# Patient Record
Sex: FEMALE | Race: WHITE | NOT HISPANIC OR LATINO
[De-identification: names, ages, dates, MRNs, and addresses within clinical notes are randomized per-mention and may not be internally consistent; named-entity substitution may affect disease eponyms.]

---

## 2017-01-27 ENCOUNTER — TRANSCRIPTION ENCOUNTER (OUTPATIENT)
Age: 57
End: 2017-01-27

## 2017-08-07 ENCOUNTER — TRANSCRIPTION ENCOUNTER (OUTPATIENT)
Age: 57
End: 2017-08-07

## 2018-01-29 ENCOUNTER — TRANSCRIPTION ENCOUNTER (OUTPATIENT)
Age: 58
End: 2018-01-29

## 2018-07-08 ENCOUNTER — TRANSCRIPTION ENCOUNTER (OUTPATIENT)
Age: 58
End: 2018-07-08

## 2019-10-03 PROBLEM — Z00.00 ENCOUNTER FOR PREVENTIVE HEALTH EXAMINATION: Status: ACTIVE | Noted: 2019-10-03

## 2019-10-04 ENCOUNTER — RECORD ABSTRACTING (OUTPATIENT)
Age: 59
End: 2019-10-04

## 2019-10-07 ENCOUNTER — NON-APPOINTMENT (OUTPATIENT)
Age: 59
End: 2019-10-07

## 2019-10-07 ENCOUNTER — APPOINTMENT (OUTPATIENT)
Dept: CARDIOLOGY | Facility: CLINIC | Age: 59
End: 2019-10-07
Payer: COMMERCIAL

## 2019-10-07 VITALS
HEIGHT: 60 IN | BODY MASS INDEX: 30.43 KG/M2 | HEART RATE: 79 BPM | RESPIRATION RATE: 15 BRPM | DIASTOLIC BLOOD PRESSURE: 60 MMHG | SYSTOLIC BLOOD PRESSURE: 100 MMHG | WEIGHT: 155 LBS

## 2019-10-07 DIAGNOSIS — R53.83 OTHER FATIGUE: ICD-10-CM

## 2019-10-07 DIAGNOSIS — I73.00 RAYNAUD'S SYNDROME W/OUT GANGRENE: ICD-10-CM

## 2019-10-07 DIAGNOSIS — Z82.49 FAMILY HISTORY OF ISCHEMIC HEART DISEASE AND OTHER DISEASES OF THE CIRCULATORY SYSTEM: ICD-10-CM

## 2019-10-07 DIAGNOSIS — Z72.3 LACK OF PHYSICAL EXERCISE: ICD-10-CM

## 2019-10-07 PROCEDURE — 99204 OFFICE O/P NEW MOD 45 MIN: CPT

## 2019-10-07 PROCEDURE — 93000 ELECTROCARDIOGRAM COMPLETE: CPT

## 2019-10-07 RX ORDER — PREDNISONE 5 MG/1
5 TABLET ORAL
Refills: 0 | Status: ACTIVE | COMMUNITY

## 2019-10-07 NOTE — HISTORY OF PRESENT ILLNESS
[FreeTextEntry1] : Patient comes to the office today for cardiac evaluation given multiple symptoms, primarily shortness of breath and coldness in her extremities. Patient's admission her shortness of breath for some time, primarily since she had a lobectomy last year. She notes over the shortness of breath seems to be getting a bit worse and she cannot do some relatively basic activities without stopping. She also notes more recently her fingers and toes have been very cold and she is worried about her circulation. She is in the midst of a lupus flare I now and feels very tired but relates that likely to her lupus. Patient denies chest pain, palpitations, orthopnea, presyncope, syncope.

## 2019-10-07 NOTE — PHYSICAL EXAM
[General Appearance - In No Acute Distress] : no acute distress [Normal Conjunctiva] : the conjunctiva exhibited no abnormalities [Normal Oral Mucosa] : normal oral mucosa [Auscultation Breath Sounds / Voice Sounds] : lungs were clear to auscultation bilaterally [Abdomen Tenderness] : non-tender [Abdomen Soft] : soft [Nail Clubbing] : no clubbing of the fingernails [Abnormal Walk] : normal gait [Cyanosis, Localized] : no localized cyanosis [Skin Color & Pigmentation] : normal skin color and pigmentation [Oriented To Time, Place, And Person] : oriented to person, place, and time [Affect] : the affect was normal [Rhythm Regular] : regular [Normal Rate] : normal [Normal S1] : normal S1 [Normal S2] : normal S2 [II] : a grade 2 [FreeTextEntry1] : No JVD, no carotid bruits. [S3] : no S3 [S4] : no S4

## 2019-10-07 NOTE — ASSESSMENT
[FreeTextEntry1] : EKG: Sinus rhythm with no significant ST or T wave changes.\par \par 58-year-old female with past medical history of dyslipidemia, lupus, lung cancer status post lobectomy who presents to me for cardiac evaluation given some worsening shortness of breath as well as coldness in her extremities. Patient's has risk factors for coronary disease and I would be concerned about angina as a potential cause of some of her shortness of breath. She is unwilling to have stress testing at this time and had initially requested a calcium score. I have instead suggested a cardiac CT scan she is willing to have this done. With regards to the coldness in her extremities this may be related to a history of Raynaud or possibly to her lupus and other vasculitis. She does have distal pulses palpable in sure there is no evidence of significant PAD. Blood pressure appears to be controlled at this time. Lipids are a bit elevated but I will await the results of her CAT scan before making any decisions regarding statin therapy.

## 2019-10-07 NOTE — DISCUSSION/SUMMARY
[FreeTextEntry1] : 1. Check cardiac CTA to rule out angina as the cause of her symptoms.\par 2. Check echocardiogram given her shortness of breath.\par 3. Check lower extremity arterial Doppler to evaluate her cold feet.\par 4. No additional cardiac medications at this time.\par 5. Patient encouraged to work on diet to lose weight.\par 6. Discussed with the patient for greater than 3 minutes about the need for smoking cessation.\par 7. No additional cardiac testing at this time.\par

## 2019-10-14 ENCOUNTER — APPOINTMENT (OUTPATIENT)
Dept: CARDIOLOGY | Facility: CLINIC | Age: 59
End: 2019-10-14

## 2019-10-22 ENCOUNTER — APPOINTMENT (OUTPATIENT)
Dept: CARDIOLOGY | Facility: CLINIC | Age: 59
End: 2019-10-22
Payer: COMMERCIAL

## 2019-10-22 PROCEDURE — 93306 TTE W/DOPPLER COMPLETE: CPT

## 2019-10-30 ENCOUNTER — APPOINTMENT (OUTPATIENT)
Dept: CARDIOLOGY | Facility: CLINIC | Age: 59
End: 2019-10-30

## 2019-11-13 ENCOUNTER — RX RENEWAL (OUTPATIENT)
Age: 59
End: 2019-11-13

## 2019-11-14 ENCOUNTER — FORM ENCOUNTER (OUTPATIENT)
Age: 59
End: 2019-11-14

## 2019-11-15 ENCOUNTER — OUTPATIENT (OUTPATIENT)
Dept: OUTPATIENT SERVICES | Facility: HOSPITAL | Age: 59
LOS: 1 days | End: 2019-11-15
Payer: COMMERCIAL

## 2019-11-15 DIAGNOSIS — R06.09 OTHER FORMS OF DYSPNEA: ICD-10-CM

## 2019-11-15 PROCEDURE — 75574 CT ANGIO HRT W/3D IMAGE: CPT | Mod: 26

## 2019-11-15 PROCEDURE — 75574 CT ANGIO HRT W/3D IMAGE: CPT

## 2019-11-27 ENCOUNTER — APPOINTMENT (OUTPATIENT)
Dept: CARDIOLOGY | Facility: CLINIC | Age: 59
End: 2019-11-27

## 2020-01-02 ENCOUNTER — NON-APPOINTMENT (OUTPATIENT)
Age: 60
End: 2020-01-02

## 2020-01-02 ENCOUNTER — APPOINTMENT (OUTPATIENT)
Dept: CARDIOLOGY | Facility: CLINIC | Age: 60
End: 2020-01-02
Payer: COMMERCIAL

## 2020-01-02 VITALS
SYSTOLIC BLOOD PRESSURE: 118 MMHG | OXYGEN SATURATION: 97 % | BODY MASS INDEX: 29.45 KG/M2 | WEIGHT: 150 LBS | HEART RATE: 99 BPM | DIASTOLIC BLOOD PRESSURE: 75 MMHG | HEIGHT: 60 IN

## 2020-01-02 PROCEDURE — 93000 ELECTROCARDIOGRAM COMPLETE: CPT

## 2020-01-02 PROCEDURE — 99214 OFFICE O/P EST MOD 30 MIN: CPT

## 2020-01-02 NOTE — PHYSICAL EXAM
[Normal Conjunctiva] : the conjunctiva exhibited no abnormalities [General Appearance - In No Acute Distress] : no acute distress [Normal Oral Mucosa] : normal oral mucosa [Rhythm Regular] : regular [Auscultation Breath Sounds / Voice Sounds] : lungs were clear to auscultation bilaterally [Normal Rate] : normal [Normal S2] : normal S2 [Normal S1] : normal S1 [II] : a grade 2 [Abdomen Tenderness] : non-tender [Abdomen Soft] : soft [Abnormal Walk] : normal gait [Cyanosis, Localized] : no localized cyanosis [Nail Clubbing] : no clubbing of the fingernails [Oriented To Time, Place, And Person] : oriented to person, place, and time [Skin Color & Pigmentation] : normal skin color and pigmentation [Affect] : the affect was normal [FreeTextEntry1] : No JVD, no carotid bruits. [S4] : no S4 [S3] : no S3

## 2020-01-02 NOTE — ASSESSMENT
[FreeTextEntry1] : Echocardiogram October 22, 2019 demonstrated left ventricle normal size and function with ejection fraction of 55-60%. Mild mitral and tricuspid regurgitation noted. Aortic valve was heavily calcified with partial fusion of the non-coronary cusp leading to a functionally bicuspid valve. No significant stenosis or regurgitation.\par \par Cardiac CTA November 15, 2019 demonstrated minimal stenosis of the right proximal coronary artery with no other significant CAD. Calcium score is 14. There was noted to be mild emphysema but no other significant abnormalities.\par \par EKG: Sinus rhythm with no significant ST or T wave changes.  iRBBB.\par \par 58-year-old female with past medical history of dyslipidemia, lupus, lung cancer status post lobectomy who presents to me for cardiac evaluation given some worsening shortness of breath as well as coldness in her extremities. Cardiac CTA demonstrates no significant CAD and certainly no cause for her shortness of breath. Echocardiogram demonstrates a normal EF with a calcified aortic valve but no other significant valve disease. At this time her shortness of breath is likely secondary to underlying lung disease and possibly lupus. She has yet to have lower extremity arterial Doppler that had been recommended because of issues with her insurance but she has good distal pulses and ultimately this seems an unlikely cause of her cold feet which may more be secondary to vasculitis and Raynauds.

## 2020-01-02 NOTE — DISCUSSION/SUMMARY
[FreeTextEntry1] : 1. Check lower extremity arterial Doppler to evaluate her cold feet.\par 2. No additional cardiac testing at this time.\par 3. Based on the results of her cardiac CTA I do recommend consideration of statin therapy. I would like to obtain her most recent bloodwork, but ultimately she will discuss this with you.\par 4. No additional cardiac medications at this time.\par 5. Patient encouraged to work on diet to lose weight.\par 6. Discussed with the patient for greater than 3 minutes about the need for smoking cessation.\par 7. Follow up here in one year or as needed.\par

## 2020-01-02 NOTE — HISTORY OF PRESENT ILLNESS
[FreeTextEntry1] : Patient returns to office today feeling about the same. She continues to have issues with shortness of breath which has become chronic and is essentially unchanged. She still complains of coldness in her feet and her hands at times. She reports no other new symptoms at this time. She is to followup regarding a possible diagnosis of lupus. Patient denies chest pain, palpitations, orthopnea, presyncope, syncope.

## 2020-12-21 ENCOUNTER — NON-APPOINTMENT (OUTPATIENT)
Age: 60
End: 2020-12-21

## 2020-12-21 ENCOUNTER — APPOINTMENT (OUTPATIENT)
Dept: CARDIOLOGY | Facility: CLINIC | Age: 60
End: 2020-12-21
Payer: COMMERCIAL

## 2020-12-21 VITALS
HEART RATE: 75 BPM | TEMPERATURE: 97.6 F | SYSTOLIC BLOOD PRESSURE: 113 MMHG | WEIGHT: 177 LBS | BODY MASS INDEX: 34.75 KG/M2 | DIASTOLIC BLOOD PRESSURE: 77 MMHG | HEIGHT: 60 IN | RESPIRATION RATE: 15 BRPM

## 2020-12-21 DIAGNOSIS — R68.89 OTHER GENERAL SYMPTOMS AND SIGNS: ICD-10-CM

## 2020-12-21 PROCEDURE — 99213 OFFICE O/P EST LOW 20 MIN: CPT

## 2020-12-21 PROCEDURE — 93000 ELECTROCARDIOGRAM COMPLETE: CPT

## 2020-12-21 PROCEDURE — 99072 ADDL SUPL MATRL&STAF TM PHE: CPT

## 2020-12-21 RX ORDER — HYDROXYCHLOROQUINE SULFATE 200 MG/1
200 TABLET, FILM COATED ORAL
Qty: 60 | Refills: 0 | Status: ACTIVE | COMMUNITY
Start: 2020-09-14

## 2020-12-21 RX ORDER — LEFLUNOMIDE 20 MG/1
20 TABLET, FILM COATED ORAL
Qty: 30 | Refills: 0 | Status: DISCONTINUED | COMMUNITY
Start: 2020-08-17 | End: 2020-12-21

## 2020-12-21 RX ORDER — METOPROLOL TARTRATE 50 MG/1
50 TABLET, FILM COATED ORAL
Qty: 4 | Refills: 0 | Status: DISCONTINUED | COMMUNITY
Start: 2019-11-13 | End: 2020-12-21

## 2020-12-21 RX ORDER — VALACYCLOVIR 500 MG/1
500 TABLET, FILM COATED ORAL
Qty: 12 | Refills: 0 | Status: DISCONTINUED | COMMUNITY
Start: 2020-11-30 | End: 2020-12-21

## 2020-12-21 RX ORDER — AZATHIOPRINE 50 1/1
50 TABLET ORAL DAILY
Refills: 0 | Status: DISCONTINUED | COMMUNITY
End: 2020-12-21

## 2020-12-21 RX ORDER — UBIDECARENONE 200 MG
200 CAPSULE ORAL
Refills: 0 | Status: ACTIVE | COMMUNITY
Start: 2020-12-21

## 2020-12-21 NOTE — ASSESSMENT
[FreeTextEntry1] : Echocardiogram October 22, 2019 demonstrated left ventricle normal size and function with ejection fraction of 55-60%. Mild mitral and tricuspid regurgitation noted. Aortic valve was heavily calcified with partial fusion of the non-coronary cusp leading to a functionally bicuspid valve. No significant stenosis or regurgitation.\par \par Cardiac CTA November 15, 2019 demonstrated minimal stenosis of the right proximal coronary artery with no other significant CAD. Calcium score is 14. There was noted to be mild emphysema but no other significant abnormalities.\par \par EKG: Sinus rhythm with no significant ST or T wave changes.  \par \par 60-year-old female with past medical history of mild CAD, dyslipidemia, lupus, lung cancer status post lobectomy who presents to me for follow-up.  Patient appears to be stable from a cardiac standpoint.  No evidence of ischemia or CHF at this time.  I would ultimately like her to be on statin therapy but apparently she has had side effects.  If she has failed Lipitor and pravastatin I would consider Livalo as a last chance.  She will continue with coenzyme Q 10.  Certainly work with weight loss and diet would help as well as exercise.  Additionally have encouraged her to try and quit the use of the e-cigarette.

## 2020-12-21 NOTE — DISCUSSION/SUMMARY
[FreeTextEntry1] : 1. No additional cardiac testing at this time.\par 2 consider Livalo if she has failed Lipitor and pravastatin has another chance of taking a statin.  Continue Zetia for now.\par 3. No additional cardiac medications at this time.\par 4. Patient encouraged to work on diet to lose weight.\par 5. Patient is encouraged to exercise at least 30 minutes a day everyday of the week.\par 6. Discussed with the patient for greater than 3 minutes about the need for smoking cessation, including electronic cigarette.\par 7. Follow up here in one year or as needed.\par

## 2020-12-21 NOTE — HISTORY OF PRESENT ILLNESS
[FreeTextEntry1] : Patient presents back today reporting pains in her wrist at this time with regards to her lupus as her primary complaint.  Her shortness of breath on exertion is unchanged.  She apparently tried a statin medication, which she believes was Lipitor and also possibly pravastatin, but had side effects, but she does not remember what the side effects were.  She is taking coenzyme Q 10.  She was now placed on Zetia which she is tolerating.  She says her lipids have improved.  She still has similar issues with coldness in her feet but decided not to get the lower extremity Doppler and does not wish to pursue that further at this time.  She reports no other new symptoms.  Patient denies chest pain, palpitations, orthopnea, presyncope, syncope.

## 2021-03-17 ENCOUNTER — TRANSCRIPTION ENCOUNTER (OUTPATIENT)
Age: 61
End: 2021-03-17

## 2021-03-18 ENCOUNTER — APPOINTMENT (OUTPATIENT)
Dept: PULMONOLOGY | Facility: CLINIC | Age: 61
End: 2021-03-18
Payer: COMMERCIAL

## 2021-03-18 VITALS — OXYGEN SATURATION: 95 % | DIASTOLIC BLOOD PRESSURE: 66 MMHG | SYSTOLIC BLOOD PRESSURE: 130 MMHG | HEART RATE: 74 BPM

## 2021-03-18 VITALS — BODY MASS INDEX: 34.37 KG/M2 | TEMPERATURE: 97.4 F | WEIGHT: 176 LBS

## 2021-03-18 DIAGNOSIS — Z20.828 CONTACT WITH AND (SUSPECTED) EXPOSURE TO OTHER VIRAL COMMUNICABLE DISEASES: ICD-10-CM

## 2021-03-18 DIAGNOSIS — Z01.811 ENCOUNTER FOR PREPROCEDURAL RESPIRATORY EXAMINATION: ICD-10-CM

## 2021-03-18 DIAGNOSIS — Z82.49 FAMILY HISTORY OF ISCHEMIC HEART DISEASE AND OTHER DISEASES OF THE CIRCULATORY SYSTEM: ICD-10-CM

## 2021-03-18 PROCEDURE — 99204 OFFICE O/P NEW MOD 45 MIN: CPT

## 2021-03-18 PROCEDURE — 99072 ADDL SUPL MATRL&STAF TM PHE: CPT

## 2021-03-18 RX ORDER — EZETIMIBE 10 MG/1
10 TABLET ORAL
Qty: 90 | Refills: 0 | Status: DISCONTINUED | COMMUNITY
Start: 2020-11-19 | End: 2021-03-18

## 2021-03-18 NOTE — REASON FOR VISIT
[Consultation] : a consultation [Shortness of Breath] : shortness of breath [Pre-op Risk Stratification] : pre-op risk stratification

## 2021-03-18 NOTE — CONSULT LETTER
[Dear  ___] : Dear  [unfilled], [Consult Letter:] : I had the pleasure of evaluating your patient, [unfilled]. [Please see my note below.] : Please see my note below. [Consult Closing:] : Thank you very much for allowing me to participate in the care of this patient.  If you have any questions, please do not hesitate to contact me. [Sincerely,] : Sincerely, [FreeTextEntry3] : Ugo Hinkle MD\par  [DrDavid  ___] : Dr. FISCHER

## 2021-03-18 NOTE — PHYSICAL EXAM
[No Acute Distress] : no acute distress [Normal Oropharynx] : normal oropharynx [Normal Appearance] : normal appearance [No Neck Mass] : no neck mass [Normal Rate/Rhythm] : normal rate/rhythm [Normal S1, S2] : normal s1, s2 [No Murmurs] : no murmurs [No Resp Distress] : no resp distress [Clear to Auscultation Bilaterally] : clear to auscultation bilaterally [No Abnormalities] : no abnormalities [Benign] : benign [Normal Gait] : normal gait [No Clubbing] : no clubbing [No Cyanosis] : no cyanosis [No Edema] : no edema [FROM] : FROM [Normal Color/ Pigmentation] : normal color/ pigmentation [No Focal Deficits] : no focal deficits [Oriented x3] : oriented x3 [Normal Affect] : normal affect [TextBox_68] : Decreased left lower lobe consistent with prior lobectomy

## 2021-05-23 DIAGNOSIS — Z01.818 ENCOUNTER FOR OTHER PREPROCEDURAL EXAMINATION: ICD-10-CM

## 2021-05-24 ENCOUNTER — APPOINTMENT (OUTPATIENT)
Dept: DISASTER EMERGENCY | Facility: CLINIC | Age: 61
End: 2021-05-24

## 2021-05-25 LAB — SARS-COV-2 N GENE NPH QL NAA+PROBE: NOT DETECTED

## 2021-05-27 ENCOUNTER — APPOINTMENT (OUTPATIENT)
Dept: PULMONOLOGY | Facility: CLINIC | Age: 61
End: 2021-05-27
Payer: COMMERCIAL

## 2021-05-27 VITALS — HEIGHT: 60.5 IN | TEMPERATURE: 98.2 F | BODY MASS INDEX: 31.56 KG/M2 | WEIGHT: 165 LBS

## 2021-05-27 VITALS
SYSTOLIC BLOOD PRESSURE: 130 MMHG | RESPIRATION RATE: 16 BRPM | OXYGEN SATURATION: 98 % | DIASTOLIC BLOOD PRESSURE: 70 MMHG | HEART RATE: 85 BPM

## 2021-05-27 DIAGNOSIS — Z78.9 OTHER SPECIFIED HEALTH STATUS: ICD-10-CM

## 2021-05-27 DIAGNOSIS — M32.9 SYSTEMIC LUPUS ERYTHEMATOSUS, UNSPECIFIED: ICD-10-CM

## 2021-05-27 PROCEDURE — 99072 ADDL SUPL MATRL&STAF TM PHE: CPT

## 2021-05-27 PROCEDURE — 85018 HEMOGLOBIN: CPT | Mod: QW

## 2021-05-27 PROCEDURE — 94729 DIFFUSING CAPACITY: CPT

## 2021-05-27 PROCEDURE — 99214 OFFICE O/P EST MOD 30 MIN: CPT | Mod: 25

## 2021-05-27 PROCEDURE — 94727 GAS DIL/WSHOT DETER LNG VOL: CPT

## 2021-05-27 PROCEDURE — 94010 BREATHING CAPACITY TEST: CPT

## 2021-08-26 ENCOUNTER — APPOINTMENT (OUTPATIENT)
Dept: PULMONOLOGY | Facility: CLINIC | Age: 61
End: 2021-08-26

## 2021-09-21 ENCOUNTER — APPOINTMENT (OUTPATIENT)
Dept: PULMONOLOGY | Facility: CLINIC | Age: 61
End: 2021-09-21
Payer: COMMERCIAL

## 2021-09-21 PROCEDURE — 99213 OFFICE O/P EST LOW 20 MIN: CPT | Mod: 95

## 2021-09-21 NOTE — HISTORY OF PRESENT ILLNESS
[Home] : at home, [unfilled] , at the time of the visit. [Medical Office: ([facility])___] : at the medical office located in  [Verbal consent obtained from patient] : the patient, [unfilled] [TextBox_4] : Thinks Spiriva has been helpful with decreased dyspnea \par Has also lost total of 25 pounds\par

## 2021-11-12 ENCOUNTER — RX RENEWAL (OUTPATIENT)
Age: 61
End: 2021-11-12

## 2022-01-11 ENCOUNTER — APPOINTMENT (OUTPATIENT)
Dept: CARDIOLOGY | Facility: CLINIC | Age: 62
End: 2022-01-11

## 2022-04-28 ENCOUNTER — RX RENEWAL (OUTPATIENT)
Age: 62
End: 2022-04-28

## 2022-05-11 ENCOUNTER — APPOINTMENT (OUTPATIENT)
Dept: CARDIOLOGY | Facility: CLINIC | Age: 62
End: 2022-05-11
Payer: COMMERCIAL

## 2022-05-11 VITALS
HEIGHT: 60.5 IN | OXYGEN SATURATION: 100 % | RESPIRATION RATE: 16 BRPM | SYSTOLIC BLOOD PRESSURE: 107 MMHG | BODY MASS INDEX: 31.37 KG/M2 | HEART RATE: 82 BPM | DIASTOLIC BLOOD PRESSURE: 74 MMHG | WEIGHT: 164 LBS

## 2022-05-11 PROCEDURE — 99214 OFFICE O/P EST MOD 30 MIN: CPT

## 2022-05-11 PROCEDURE — 93000 ELECTROCARDIOGRAM COMPLETE: CPT

## 2022-05-11 RX ORDER — TIOTROPIUM BROMIDE INHALATION SPRAY 3.12 UG/1
2.5 SPRAY, METERED RESPIRATORY (INHALATION)
Qty: 1 | Refills: 2 | Status: DISCONTINUED | COMMUNITY
Start: 2022-04-28 | End: 2022-05-11

## 2022-05-11 NOTE — ASSESSMENT
[FreeTextEntry1] : Echocardiogram October 22, 2019 demonstrated left ventricle normal size and function with ejection fraction of 55-60%. Mild mitral and tricuspid regurgitation noted. Aortic valve was heavily calcified with partial fusion of the non-coronary cusp leading to a functionally bicuspid valve. No significant stenosis or regurgitation.\par \par Cardiac CTA November 15, 2019 demonstrated minimal stenosis of the right proximal coronary artery with no other significant CAD. Calcium score is 14. There was noted to be mild emphysema but no other significant abnormalities.\par \par EKG: Sinus rhythm with no significant ST or T wave changes.  Late R wave transition.\par \par 61-year-old female with past medical history of mild CAD, dyslipidemia, lupus, lung cancer status post lobectomy who presents to me for follow-up.  Patient appears to be stable from a cardiac standpoint.  No evidence of ischemia or CHF at this time.  She is now taking Livalo and I have encouraged her to consider increasing the dose given her most recent LDL of 129.  She is going to have repeat blood work done and we will then consider increasing the dose.  Her blood pressure appears to be well controlled at this time.  I have once again encouraged her to discontinue the use of the electronic cigarette but she seems unwilling.  Thankfully she is not smoking tobacco cigarettes.

## 2022-05-11 NOTE — DISCUSSION/SUMMARY
[FreeTextEntry1] : 1. No additional cardiac testing at this time.\par 2. Continue Livalo at 1 mg for now.  If on repeat blood work her LDL is significantly over 100 consider increasing the dose to 2 mg..\par 3. No additional cardiac medications at this time.\par 4. Patient encouraged to work on diet to lose weight.\par 5. Patient is encouraged to exercise at least 30 minutes a day everyday of the week.\par 6. Discussed with the patient for greater than 3 minutes about the need for smoking cessation, including electronic cigarette.\par 7. Follow up here in one year or as needed.\par

## 2022-05-11 NOTE — HISTORY OF PRESENT ILLNESS
[FreeTextEntry1] : Patient presents back to the office today having a lot of issues with aches and pains which she relates to possibly being in a lupus flare.  She is having a lot of stress and anxiety recently and this is also making her feel worse.  Her shortness of breath overall seems to be at her baseline and is not significantly changed recently.  She offers no other specific physical complaints at this time.  She is taking Livalo and seems to be tolerating it at the 1 mg dose.  She is no longer taking Zetia.    Patient denies chest pain, palpitations, orthopnea, presyncope, syncope. PROVIDER:[TOKEN:[[id number]]]

## 2022-05-25 ENCOUNTER — RX RENEWAL (OUTPATIENT)
Age: 62
End: 2022-05-25

## 2022-05-27 ENCOUNTER — APPOINTMENT (OUTPATIENT)
Dept: PULMONOLOGY | Facility: CLINIC | Age: 62
End: 2022-05-27
Payer: COMMERCIAL

## 2022-05-27 VITALS — DIASTOLIC BLOOD PRESSURE: 82 MMHG | OXYGEN SATURATION: 98 % | HEART RATE: 88 BPM | SYSTOLIC BLOOD PRESSURE: 120 MMHG

## 2022-05-27 DIAGNOSIS — C34.90 MALIGNANT NEOPLASM OF UNSPECIFIED PART OF UNSPECIFIED BRONCHUS OR LUNG: ICD-10-CM

## 2022-05-27 DIAGNOSIS — R06.00 DYSPNEA, UNSPECIFIED: ICD-10-CM

## 2022-05-27 DIAGNOSIS — J44.9 CHRONIC OBSTRUCTIVE PULMONARY DISEASE, UNSPECIFIED: ICD-10-CM

## 2022-05-27 DIAGNOSIS — U07.1 COVID-19: ICD-10-CM

## 2022-05-27 DIAGNOSIS — Z87.891 PERSONAL HISTORY OF NICOTINE DEPENDENCE: ICD-10-CM

## 2022-05-27 DIAGNOSIS — J43.9 EMPHYSEMA, UNSPECIFIED: ICD-10-CM

## 2022-05-27 PROCEDURE — 99214 OFFICE O/P EST MOD 30 MIN: CPT

## 2022-09-27 ENCOUNTER — APPOINTMENT (OUTPATIENT)
Dept: PULMONOLOGY | Facility: CLINIC | Age: 62
End: 2022-09-27

## 2023-05-09 ENCOUNTER — APPOINTMENT (OUTPATIENT)
Dept: CARDIOLOGY | Facility: CLINIC | Age: 63
End: 2023-05-09
Payer: COMMERCIAL

## 2023-05-09 VITALS
HEIGHT: 60.5 IN | OXYGEN SATURATION: 98 % | BODY MASS INDEX: 33.09 KG/M2 | RESPIRATION RATE: 16 BRPM | DIASTOLIC BLOOD PRESSURE: 74 MMHG | WEIGHT: 173 LBS | SYSTOLIC BLOOD PRESSURE: 106 MMHG | HEART RATE: 80 BPM

## 2023-05-09 PROCEDURE — 99214 OFFICE O/P EST MOD 30 MIN: CPT | Mod: 25

## 2023-05-09 PROCEDURE — 93000 ELECTROCARDIOGRAM COMPLETE: CPT

## 2023-05-09 NOTE — ASSESSMENT
[FreeTextEntry1] : Echocardiogram October 22, 2019 demonstrated left ventricle normal size and function with ejection fraction of 55-60%. Mild mitral and tricuspid regurgitation noted. Aortic valve was heavily calcified with partial fusion of the non-coronary cusp leading to a functionally bicuspid valve. No significant stenosis or regurgitation.\par \par Cardiac CTA November 15, 2019 demonstrated minimal stenosis of the right proximal coronary artery with no other significant CAD. Calcium score is 14. There was noted to be mild emphysema but no other significant abnormalities.\par \par EKG: Sinus rhythm with no significant ST or T wave changes.\par \par 62-year-old female with past medical history of mild CAD, dyslipidemia, lupus, lung cancer status post lobectomy who presents to me for follow-up.  Patient appears to be stable from a cardiac standpoint.  No evidence of ischemia or CHF at this time.  Recent blood work shows persistent elevation in her LDL despite being on Livalo 1 mg daily.  I once again encouraged her to increase the dose to 2 mg but she is concerned about cost and given that she is very depressed she does not really want to increase it.  She will discuss this more with her primary.  We talked about the importance of controlling her LDL in order to prevent the development of more significant coronary disease or other cardiovascular disease.  Blood pressure remains controlled at this time.

## 2023-05-09 NOTE — DISCUSSION/SUMMARY
[FreeTextEntry1] : 1. No additional cardiac testing at this time.\par 2. Patient is encouraged to increase Livalo to 2 mg daily and will discuss this with her primary.  Follow-up with him for blood work as well.\par 3. No additional cardiac medications at this time.\par 4. Patient encouraged to work on diet to lose weight.\par 5. Patient is encouraged to exercise at least 30 minutes a day everyday of the week.\par 6. Discussed with the patient for greater than 3 minutes about the need for smoking cessation, including electronic cigarette.\par 7. Encourage patient to follow-up with her gynecologist and for her sonogram.\par 8. Follow up here in one year or as needed.\par

## 2023-05-09 NOTE — HISTORY OF PRESENT ILLNESS
[FreeTextEntry1] : Patient presents back today feeling very depressed.  She notes that her LDL is a bit high and that I had told her to increase Livalo but she is unwilling to do at this time.  She is concerned about increased cost and just does not want to do it because she is depressed.  She is seeing a therapist on a weekly basis but this does not really seem to be helping.  She also has been having some heaviness in her lower abdomen and was instructed to have an internal sonogram which is scheduled for tomorrow but she does not know if she wants to do it.  She does not report any other real physical symptoms.  Patient denies chest pain, shortness of breath, palpitations, orthopnea, presyncope, syncope.

## 2024-04-29 ENCOUNTER — NON-APPOINTMENT (OUTPATIENT)
Age: 64
End: 2024-04-29

## 2024-04-29 ENCOUNTER — APPOINTMENT (OUTPATIENT)
Dept: CARDIOLOGY | Facility: CLINIC | Age: 64
End: 2024-04-29
Payer: COMMERCIAL

## 2024-04-29 VITALS
HEIGHT: 60.05 IN | RESPIRATION RATE: 16 BRPM | SYSTOLIC BLOOD PRESSURE: 138 MMHG | DIASTOLIC BLOOD PRESSURE: 80 MMHG | BODY MASS INDEX: 34.1 KG/M2 | WEIGHT: 176 LBS | HEART RATE: 81 BPM

## 2024-04-29 DIAGNOSIS — I25.10 ATHEROSCLEROTIC HEART DISEASE OF NATIVE CORONARY ARTERY W/OUT ANGINA PECTORIS: ICD-10-CM

## 2024-04-29 DIAGNOSIS — R03.0 ELEVATED BLOOD-PRESSURE READING, W/OUT DIAGNOSIS OF HYPERTENSION: ICD-10-CM

## 2024-04-29 DIAGNOSIS — E78.5 HYPERLIPIDEMIA, UNSPECIFIED: ICD-10-CM

## 2024-04-29 DIAGNOSIS — E66.9 OBESITY, UNSPECIFIED: ICD-10-CM

## 2024-04-29 PROCEDURE — G2211 COMPLEX E/M VISIT ADD ON: CPT

## 2024-04-29 PROCEDURE — 93000 ELECTROCARDIOGRAM COMPLETE: CPT

## 2024-04-29 PROCEDURE — 99214 OFFICE O/P EST MOD 30 MIN: CPT

## 2024-04-29 RX ORDER — ALPRAZOLAM 0.25 MG/1
0.25 TABLET ORAL
Qty: 30 | Refills: 0 | Status: DISCONTINUED | COMMUNITY
Start: 2020-09-15 | End: 2024-04-29

## 2024-04-29 RX ORDER — SIMVASTATIN 20 MG/1
20 TABLET, FILM COATED ORAL
Qty: 30 | Refills: 0 | Status: ACTIVE | COMMUNITY
Start: 2024-03-25

## 2024-04-29 RX ORDER — ALBUTEROL SULFATE 90 UG/1
108 (90 BASE) INHALANT RESPIRATORY (INHALATION)
Qty: 8 | Refills: 0 | Status: ACTIVE | COMMUNITY
Start: 2024-04-09

## 2024-04-29 RX ORDER — PITAVASTATIN CALCIUM 1.04 MG/1
1 TABLET, FILM COATED ORAL
Refills: 0 | Status: DISCONTINUED | COMMUNITY
End: 2024-04-29

## 2024-04-29 RX ORDER — TIOTROPIUM BROMIDE INHALATION SPRAY 3.12 UG/1
2.5 SPRAY, METERED RESPIRATORY (INHALATION)
Qty: 3 | Refills: 1 | Status: DISCONTINUED | COMMUNITY
Start: 2021-05-27 | End: 2024-04-29

## 2024-04-29 RX ORDER — ALPRAZOLAM 0.5 MG/1
0.5 TABLET ORAL
Qty: 30 | Refills: 0 | Status: ACTIVE | COMMUNITY
Start: 2024-04-03

## 2024-04-29 NOTE — ASSESSMENT
[FreeTextEntry1] : Echocardiogram October 22, 2019 demonstrated left ventricle normal size and function with ejection fraction of 55-60%. Mild mitral and tricuspid regurgitation noted. Aortic valve was heavily calcified with partial fusion of the non-coronary cusp leading to a functionally bicuspid valve. No significant stenosis or regurgitation.  Cardiac CTA November 15, 2019 demonstrated minimal stenosis of the right proximal coronary artery with no other significant CAD. Calcium score is 14. There was noted to be mild emphysema but no other significant abnormalities.  EKG: Sinus rhythm with no significant ST or T wave changes.  62-year-old female with past medical history of mild CAD, dyslipidemia, lupus, lung cancer status post lobectomy who presents to me for follow-up.  Patient presents back having recently changed her medication for her dyslipidemia.  Her repeat blood work does show an improved LDL overall from last year but still higher than it should be.  She only changed recently so we will see what her blood work shows next time.  I will not make any changes to medication today.  Her blood pressure is more elevated than it has been in the past.  It was also elevated at her primary doctor's office.  She says she is stressed about coming here.  She will start monitoring her blood pressure regularly at home and bring her machine in to be calibrated.  I will also do a 24-hour monitor to determine whether she needs treatment for hypertension.  She has not had evaluation of her mild coronary disease in 5 years and I have recommended stress testing as well.  EKG today is unremarkable.

## 2024-04-29 NOTE — HISTORY OF PRESENT ILLNESS
[FreeTextEntry1] : Patient presents back to the office today noting that she continues to be very stressed in her daily life.  Her Livalo was changed to simvastatin due to cost concerns.  This was done about a month ago.  She was in her doctor's office at that time and her blood pressure was elevated at 140/92.  She said she would be coming here and they told her to follow-up but no medications were given.  Symptomatically she offers no specific physical complaints.  She is not very active but reports no significant symptoms in her daily activities.  Patient denies chest pain, shortness of breath, palpitations, orthopnea, presyncope, syncope.

## 2024-04-29 NOTE — PHYSICAL EXAM
[General Appearance - In No Acute Distress] : no acute distress [Normal Conjunctiva] : the conjunctiva exhibited no abnormalities [Normal Oral Mucosa] : normal oral mucosa [Normal Rate] : normal [Rhythm Regular] : regular [Normal S1] : normal S1 [Normal S2] : normal S2 [II] : a grade 2 [Abdomen Soft] : soft [Abdomen Tenderness] : non-tender [Abnormal Walk] : normal gait [Nail Clubbing] : no clubbing of the fingernails [Cyanosis, Localized] : no localized cyanosis [Skin Color & Pigmentation] : normal skin color and pigmentation [Oriented To Time, Place, And Person] : oriented to person, place, and time [Affect] : the affect was normal [FreeTextEntry1] : Occasional wheezing [S3] : no S3 [S4] : no S4

## 2024-04-29 NOTE — DISCUSSION/SUMMARY
[FreeTextEntry1] : Pharmacologic nuclear stress test to reevaluate her prior CAD.  Patient is not willing to consider an exercise test. 2.  Check 24-hour blood pressure monitor to fully assess her blood pressure control and determine need for treatment. 3.  Continue simvastatin at 20 mg daily for dyslipidemia.  Repeat blood work with her primary. 4.  Monitor BP at home, keep a log and bring to f/u.  She will bring her machine in to have it calibrated. 5.  No additional cardiac medications at this time. 6.  Patient encouraged to work on diet to lose weight. 7.  Patient is encouraged to exercise at least 30 minutes a day everyday of the week. 8.  Discussed with the patient for greater than 3 minutes about the need for smoking cessation, including electronic cigarette. 9.  Follow up here after testing, and will make further recommendations at that time. [EKG obtained to assist in diagnosis and management of assessed problem(s)] : EKG obtained to assist in diagnosis and management of assessed problem(s)

## 2024-05-28 ENCOUNTER — APPOINTMENT (OUTPATIENT)
Dept: CARDIOLOGY | Facility: CLINIC | Age: 64
End: 2024-05-28
Payer: COMMERCIAL

## 2024-05-28 PROCEDURE — 93784 AMBL BP MNTR W/SOFTWARE: CPT

## 2024-06-12 ENCOUNTER — APPOINTMENT (OUTPATIENT)
Dept: CARDIOLOGY | Facility: CLINIC | Age: 64
End: 2024-06-12

## 2024-06-18 ENCOUNTER — APPOINTMENT (OUTPATIENT)
Dept: CARDIOLOGY | Facility: CLINIC | Age: 64
End: 2024-06-18

## 2025-07-22 ENCOUNTER — NON-APPOINTMENT (OUTPATIENT)
Age: 65
End: 2025-07-22

## 2025-07-22 ENCOUNTER — APPOINTMENT (OUTPATIENT)
Dept: CARDIOLOGY | Facility: CLINIC | Age: 65
End: 2025-07-22
Payer: COMMERCIAL

## 2025-07-22 VITALS
HEIGHT: 60 IN | RESPIRATION RATE: 16 BRPM | WEIGHT: 171 LBS | BODY MASS INDEX: 33.57 KG/M2 | DIASTOLIC BLOOD PRESSURE: 79 MMHG | HEART RATE: 68 BPM | SYSTOLIC BLOOD PRESSURE: 136 MMHG

## 2025-07-22 DIAGNOSIS — R06.02 SHORTNESS OF BREATH: ICD-10-CM

## 2025-07-22 DIAGNOSIS — I25.10 ATHEROSCLEROTIC HEART DISEASE OF NATIVE CORONARY ARTERY W/OUT ANGINA PECTORIS: ICD-10-CM

## 2025-07-22 DIAGNOSIS — R03.0 ELEVATED BLOOD-PRESSURE READING, W/OUT DIAGNOSIS OF HYPERTENSION: ICD-10-CM

## 2025-07-22 DIAGNOSIS — E78.5 HYPERLIPIDEMIA, UNSPECIFIED: ICD-10-CM

## 2025-07-22 PROCEDURE — 93000 ELECTROCARDIOGRAM COMPLETE: CPT

## 2025-07-22 PROCEDURE — 99214 OFFICE O/P EST MOD 30 MIN: CPT

## 2025-07-22 RX ORDER — PREDNISONE 10 MG
TABLET ORAL
Refills: 0 | Status: ACTIVE | COMMUNITY

## 2025-07-22 RX ORDER — AMOXICILLIN 500 MG/1
CAPSULE ORAL
Refills: 0 | Status: ACTIVE | COMMUNITY

## 2025-08-01 ENCOUNTER — APPOINTMENT (OUTPATIENT)
Dept: CARDIOLOGY | Facility: CLINIC | Age: 65
End: 2025-08-01
Payer: COMMERCIAL

## 2025-08-01 PROCEDURE — 93306 TTE W/DOPPLER COMPLETE: CPT

## 2025-09-16 ENCOUNTER — APPOINTMENT (OUTPATIENT)
Dept: CARDIOLOGY | Facility: CLINIC | Age: 65
End: 2025-09-16